# Patient Record
Sex: MALE | Race: WHITE | URBAN - METROPOLITAN AREA
[De-identification: names, ages, dates, MRNs, and addresses within clinical notes are randomized per-mention and may not be internally consistent; named-entity substitution may affect disease eponyms.]

---

## 2017-09-28 ENCOUNTER — OFFICE VISIT (OUTPATIENT)
Dept: FAMILY MEDICINE | Facility: CLINIC | Age: 62
End: 2017-09-28
Payer: COMMERCIAL

## 2017-09-28 VITALS
TEMPERATURE: 98.3 F | RESPIRATION RATE: 18 BRPM | SYSTOLIC BLOOD PRESSURE: 121 MMHG | WEIGHT: 211 LBS | BODY MASS INDEX: 33.91 KG/M2 | HEART RATE: 70 BPM | DIASTOLIC BLOOD PRESSURE: 85 MMHG | HEIGHT: 66 IN | OXYGEN SATURATION: 98 %

## 2017-09-28 DIAGNOSIS — Z13.220 SCREENING FOR LIPID DISORDERS: ICD-10-CM

## 2017-09-28 DIAGNOSIS — Z11.59 NEED FOR HEPATITIS C SCREENING TEST: ICD-10-CM

## 2017-09-28 DIAGNOSIS — Z12.11 SCREEN FOR COLON CANCER: ICD-10-CM

## 2017-09-28 DIAGNOSIS — C61 MALIGNANT NEOPLASM OF PROSTATE (H): Primary | ICD-10-CM

## 2017-09-28 DIAGNOSIS — A60.00 GENITAL HERPES SIMPLEX, UNSPECIFIED SITE: ICD-10-CM

## 2017-09-28 DIAGNOSIS — Z23 NEED FOR VACCINATION: ICD-10-CM

## 2017-09-28 DIAGNOSIS — G47.9 SLEEP DISTURBANCE: ICD-10-CM

## 2017-09-28 PROCEDURE — 84153 ASSAY OF PSA TOTAL: CPT | Performed by: INTERNAL MEDICINE

## 2017-09-28 PROCEDURE — 80061 LIPID PANEL: CPT | Performed by: INTERNAL MEDICINE

## 2017-09-28 PROCEDURE — 36415 COLL VENOUS BLD VENIPUNCTURE: CPT | Performed by: INTERNAL MEDICINE

## 2017-09-28 PROCEDURE — 86803 HEPATITIS C AB TEST: CPT | Performed by: INTERNAL MEDICINE

## 2017-09-28 PROCEDURE — 90471 IMMUNIZATION ADMIN: CPT | Performed by: INTERNAL MEDICINE

## 2017-09-28 PROCEDURE — 90714 TD VACC NO PRESV 7 YRS+ IM: CPT | Performed by: INTERNAL MEDICINE

## 2017-09-28 PROCEDURE — 99203 OFFICE O/P NEW LOW 30 MIN: CPT | Mod: 25 | Performed by: INTERNAL MEDICINE

## 2017-09-28 RX ORDER — ACYCLOVIR 200 MG/1
200 CAPSULE ORAL
Qty: 25 CAPSULE | Refills: 11 | Status: SHIPPED | OUTPATIENT
Start: 2017-09-28

## 2017-09-28 RX ORDER — ZOLPIDEM TARTRATE 5 MG/1
5 TABLET ORAL
Qty: 30 TABLET | Refills: 2 | Status: SHIPPED | OUTPATIENT
Start: 2017-09-28

## 2017-09-28 NOTE — NURSING NOTE
"    Screening Questionnaire for Adult Immunization     Are you sick today?   No    Do you have allergies to medications, food or any vaccine?   No    Have you ever had a serious reaction after receiving a vaccination?   No    Do you have a long-term health problem with heart disease, lung disease,  asthma, kidney disease, diabetes, anemia, metabolic or blood disease?   No    Do you have cancer, leukemia, AIDS, or any immune system problem?   No    Do you take cortisone, prednisone, other steroids, or anticancer drugs, or  have you had any x-ray (radiation) treatments?   No    Have you had a seizure, brain, or other nervous system problem?   No    During the past year, have you received a transfusion of blood or blood       products, or been given a medicine called immune (gamma) globulin?   No    For women: Are you pregnant or is there a chance you could become         pregnant during the next month?   No    Have you received any vaccinations in the past 4 weeks?   No     Immunization questionnaire answers were all negative.      MNVFC doesn't apply on this patient     Screening performed by Crystal Cage on 9/28/2017 at 3:08 PM.    Prior to injection verified patient identity using patient's name and date of birth.    Per orders of Dr. Apple (TD circled on rooming slip and verbal \"TD not TDAP\" also stated by )     Lluvia BECKHAM MA , injection of TD given by Crystal Cage. Patient instructed to remain in clinic for 15 minutes afterwards, and to report any adverse reaction to me immediately.        "

## 2017-09-28 NOTE — PROGRESS NOTES
SUBJECTIVE:                                                    Albino Tejada is a 62 year old male who presents to clinic today for the following health issues:      New Patient/Transfer of Care    Pleasant 62-year-old man with a history of prostate cancer, status post prostatectomy also with a history of genital herpes, sleep disturbance for which he takes rare intermittent Ambien. He has a remote history of complex lumbar spine surgeries. He required 5 lumbar spine surgeries. Luckily, since 2012, his lumbar spine pain has been under very good control. He has been able to get back into a routine exercise regimen. He recently moved here from [place]. His previous job in [place], required long hours and he was not able to exercise as much as he would've liked. However, his current job has more flexibility, and he is motivated to get into a exercise regimen to promote weight loss.    Problem list and histories reviewed & adjusted, as indicated.  Additional history: as documented    Patient Active Problem List   Diagnosis     Osteoarthrosis, unspecified whether generalized or localized, other specified sites     Sciatica     Allergic rhinitis     Diverticulitis of colon     MALIGN NEOPL PROSTATE-s/p surgery and radiation 2006     Past Surgical History:   Procedure Laterality Date     GENITOURINARY SURGERY      urethral stricture repair     HEMORRHOIDECTOMY,EXTERNAL      Hemorrhoidectomy     ORTHOPEDIC SURGERY      5 X lumbar spine surgeries      SUPRAPUBIC PROSTATECTOMY         Social History   Substance Use Topics     Smoking status: Former Smoker     Packs/day: 0.50     Years: 6.00     Quit date: 1/1/1979     Smokeless tobacco: Never Used     Alcohol use 2.0 oz/week      Comment: a couple times a week     Family History   Problem Relation Age of Onset     CANCER Mother      breast     CANCER Father      colon     Arthritis No family hx of      Alzheimer Disease No family hx of      HEART DISEASE No family hx  "of          Current Outpatient Prescriptions   Medication Sig Dispense Refill     acyclovir (ZOVIRAX) 200 MG capsule Take 1 capsule (200 mg) by mouth 5 times daily Take at first sign of outbreak 25 capsule 11     zolpidem (AMBIEN) 5 MG tablet Take 1 tablet (5 mg) by mouth nightly as needed 30 tablet 2     [DISCONTINUED] acyclovir (ZOVIRAX) 200 MG capsule Take 1 capsule by mouth 5 times daily. 25 capsule 5     No Known Allergies      ROS:  Constitutional, HEENT, cardiovascular, pulmonary, gi and gu systems are negative, except as otherwise noted.      OBJECTIVE:   /85 (BP Location: Right arm, Patient Position: Chair, Cuff Size: Adult Large)  Pulse 70  Temp 98.3  F (36.8  C) (Oral)  Resp 18  Ht 5' 6\" (1.676 m)  Wt 211 lb (95.7 kg)  SpO2 98%  BMI 34.06 kg/m2  Body mass index is 34.06 kg/(m^2).  GENERAL: healthy, alert and no distress  EYES: Eyes grossly normal to inspection, PERRL and conjunctivae and sclerae normal  HENT: ear canals and TM's normal, nose and mouth without ulcers or lesions  NECK: no adenopathy, no asymmetry, masses, or scars and thyroid normal to palpation  RESP: lungs clear to auscultation - no rales, rhonchi or wheezes  CV: regular rate and rhythm, normal S1 S2, no S3 or S4, no murmur, click or rub, no peripheral edema and peripheral pulses strong  ABDOMEN: Obese, soft, nontender, no hepatosplenomegaly, no masses and bowel sounds normal  MS: no gross musculoskeletal defects noted, no edema  SKIN: no suspicious lesions or rashes  NEURO: Normal strength and tone, mentation intact and speech normal  PSYCH: mentation appears normal, affect normal/bright    Diagnostic Test Results:  PSA tumor marker, hep C screening tests, lipid panel pending    ASSESSMENT/PLAN:     1. Malignant neoplasm of prostate (H)    - PSA, tumor marker    2. Genital herpes simplex, unspecified site  Continue as needed acyclovir for breakouts  - acyclovir (ZOVIRAX) 200 MG capsule; Take 1 capsule (200 mg) by mouth 5 " times daily Take at first sign of outbreak  Dispense: 25 capsule; Refill: 11    3. Sleep disturbance  Discussed risks of Ambien, rare intermittent use at low-dose is acceptable  - zolpidem (AMBIEN) 5 MG tablet; Take 1 tablet (5 mg) by mouth nightly as needed  Dispense: 30 tablet; Refill: 2    4. Need for hepatitis C screening test    - Hepatitis C Screen Reflex to HCV RNA Quant and Genotype    5. Screen for colon cancer    - GASTROENTEROLOGY ADULT REF PROCEDURE ONLY    6. Screening for lipid disorders    - Lipid panel reflex to direct LDL      Tetanus booster today    Counseled on diet and exercise interventions to promote weight loss        FUTURE APPOINTMENTS:       - Pending labs and symptoms    Boyd Apple MD  [facility]

## 2017-09-28 NOTE — MR AVS SNAPSHOT
After Visit Summary   9/28/2017    Albino Tejada    MRN: [id number]           Patient Information     Date Of Birth          1955        Visit Information        Provider Department      9/28/2017 1:30 PM Boyd Apple MD [facility]        Today's Diagnoses     Malignant neoplasm of prostate (H)    -  1    Genital herpes simplex, unspecified site        Sleep disturbance        Need for hepatitis C screening test        Screen for colon cancer        Screening for lipid disorders           Follow-ups after your visit        Additional Services     GASTROENTEROLOGY ADULT REF PROCEDURE ONLY       Last Lab Result: Creatinine (mg/dL)       Date                     Value                 06/06/2007               0.9              ----------  Body mass index is 34.06 kg/(m^2).     Needed:  No  Language:  English    Patient will be contacted to schedule procedure.     Please be aware that coverage of these services is subject to the terms and limitations of your health insurance plan.  Call member services at your health plan with any benefit or coverage questions.  Any procedures must be performed at a [place] facility OR coordinated by your clinic's referral office.    Please bring the following with you to your appointment:    (1) Any X-Rays, CTs or MRIs which have been performed.  Contact the facility where they were done to arrange for  prior to your scheduled appointment.    (2) List of current medications   (3) This referral request   (4) Any documents/labs given to you for this referral                  Who to contact     If you have questions or need follow up information about today's clinic visit or your schedule please contact [facility] directly at [phone].  Normal or non-critical lab and imaging results will be communicated to you by MyChart, letter or phone within 4 business days after the clinic has received the results. If you do not hear  "from us within 7 days, please contact the clinic through [employer] or phone. If you have a critical or abnormal lab result, we will notify you by phone as soon as possible.  Submit refill requests through [employer] or call your pharmacy and they will forward the refill request to us. Please allow 3 business days for your refill to be completed.          Additional Information About Your Visit        [employer] Information     [employer] gives you secure access to your electronic health record. If you see a primary care provider, you can also send messages to your care team and make appointments. If you have questions, please call your primary care clinic.  If you do not have a primary care provider, please call [phone] and they will assist you.        Care EveryWhere ID     This is your Care EveryWhere ID. This could be used by other organizations to access your [facility] medical records  [id number]        Your Vitals Were     Pulse Temperature Respirations Height Pulse Oximetry BMI (Body Mass Index)    70 98.3  F (36.8  C) (Oral) 18 5' 6\" (1.676 m) 98% 34.06 kg/m2       Blood Pressure from Last 3 Encounters:   09/28/17 121/85   03/25/11 122/74   06/10/08 106/70    Weight from Last 3 Encounters:   09/28/17 211 lb (95.7 kg)   03/25/11 197 lb (89.4 kg)   06/10/08 189 lb (85.7 kg)              We Performed the Following     GASTROENTEROLOGY ADULT REF PROCEDURE ONLY     Hepatitis C Screen Reflex to HCV RNA Quant and Genotype     Lipid panel reflex to direct LDL     PSA, tumor marker          Today's Medication Changes          These changes are accurate as of: 9/28/17  2:15 PM.  If you have any questions, ask your nurse or doctor.               These medicines have changed or have updated prescriptions.        Dose/Directions    acyclovir 200 MG capsule   Commonly known as:  ZOVIRAX   This may have changed:  additional instructions   Used for:  Genital herpes simplex, unspecified site   Changed by:  Boyd Apple MD     "    Dose:  200 mg   Take 1 capsule (200 mg) by mouth 5 times daily Take at first sign of outbreak   Quantity:  25 capsule   Refills:  11       zolpidem 5 MG tablet   Commonly known as:  AMBIEN   This may have changed:    - when to take this  - reasons to take this   Used for:  Sleep disturbance   Changed by:  Boyd Apple MD        Dose:  5 mg   Take 1 tablet (5 mg) by mouth nightly as needed   Quantity:  30 tablet   Refills:  2         Stop taking these medicines if you haven't already. Please contact your care team if you have questions.     metaxalone 800 MG tablet   Commonly known as:  SKELAXIN   Stopped by:  Boyd Apple MD           methylPREDNISolone 4 MG tablet   Commonly known as:  MEDROL DOSEPACK   Stopped by:  Boyd Apple MD           oxyCODONE-acetaminophen 5-325 MG per tablet   Commonly known as:  PERCOCET   Stopped by:  Boyd Apple MD           traMADol 50 MG tablet   Commonly known as:  ULTRAM   Stopped by:  Boyd Apple MD                Where to get your medicines      These medications were sent to [facility] [address] [place], [place] [id number]     Phone:  [phone]     acyclovir 200 MG capsule         Some of these will need a paper prescription and others can be bought over the counter.  Ask your nurse if you have questions.     Bring a paper prescription for each of these medications     zolpidem 5 MG tablet                Primary Care Provider Office Phone # Fax #    Boyd Apple [phone]       [place] [address] [address]        Equal Access to Services     [facility] AH: Hadii aad ku hadasho Soomaali, waaxda luqadaha, qaybta kaalmada adeegyada, brandi idireji arboleda. So [facility] [phone].    ATENCIÓN: Si habla español, tiene a koenig disposición servicios gratuitos de asistencia lingüística. Llame al [phone].    We comply with  applicable federal civil rights laws and [place] laws. We do not discriminate on the basis of race, color, national origin, age, disability sex, sexual orientation or gender identity.            Thank you!     Thank you for choosing [facility]  for your care. Our goal is always to provide you with excellent care. Hearing back from our patients is one way we can continue to improve our services. Please take a few minutes to complete the written survey that you may receive in the mail after your visit with us. Thank you!             Your Updated Medication List - Protect others around you: Learn how to safely use, store and throw away your medicines at www.disposemymeds.org.          This list is accurate as of: 9/28/17  2:15 PM.  Always use your most recent med list.                   Brand Name Dispense Instructions for use Diagnosis    acyclovir 200 MG capsule    ZOVIRAX    25 capsule    Take 1 capsule (200 mg) by mouth 5 times daily Take at first sign of outbreak    Genital herpes simplex, unspecified site       zolpidem 5 MG tablet    AMBIEN    30 tablet    Take 1 tablet (5 mg) by mouth nightly as needed    Sleep disturbance

## 2017-09-29 LAB
CHOLEST SERPL-MCNC: 198 MG/DL
HCV AB SERPL QL IA: NONREACTIVE
HDLC SERPL-MCNC: 57 MG/DL
LDLC SERPL CALC-MCNC: 110 MG/DL
NONHDLC SERPL-MCNC: 141 MG/DL
PSA SERPL-MCNC: <0.01 UG/L (ref 0–4)
TRIGL SERPL-MCNC: 153 MG/DL

## 2017-10-01 NOTE — PROGRESS NOTES
"The following letter pertains to your most recent diagnostic tests:    -Your hepatitis C screening test is negative.  You do not have hepatitis C.     -Your prostate specific antigen (PSA) test result returned normal.     -Your total cholesterol is 198 which is at your goal of total cholesterol less than 200.    -Your triglycerides are 153 which are just above your goal of triglycerides less than 150.    -Your HDL or \"good cholesterol\" is 57 which is at your goal of HDL cholesterol greater than 40.    -Your LDL cholesterol or \"bad cholesterol\" is 119 which is at your goal of LDL cholesterol less than <130.  Your LDL goal is based on your risk factors for artery disease.       Bottom line:  Watch the sugars, starches and fats in your diet to improve your triglycerides.  Otherwise your lab results look healthy.        Follow up:  Schedule an appointment for a physical examination with fasting blood tests in one year's time, or return sooner if new questions, symptoms or problems arise.       Sincerely,    Dr. Apple"

## 2017-10-26 ENCOUNTER — SURGERY (OUTPATIENT)
Age: 62
End: 2017-10-26

## 2017-10-26 ENCOUNTER — HOSPITAL ENCOUNTER (OUTPATIENT)
Facility: CLINIC | Age: 62
Discharge: NURSING, REHAB OR LONG-TERM CARE | End: 2017-10-26
Attending: INTERNAL MEDICINE | Admitting: INTERNAL MEDICINE
Payer: COMMERCIAL

## 2017-10-26 VITALS
RESPIRATION RATE: 11 BRPM | WEIGHT: 195 LBS | OXYGEN SATURATION: 94 % | SYSTOLIC BLOOD PRESSURE: 100 MMHG | BODY MASS INDEX: 31.34 KG/M2 | DIASTOLIC BLOOD PRESSURE: 75 MMHG | HEIGHT: 66 IN

## 2017-10-26 LAB — COLONOSCOPY: NORMAL

## 2017-10-26 PROCEDURE — 25000128 H RX IP 250 OP 636: Performed by: INTERNAL MEDICINE

## 2017-10-26 PROCEDURE — 99153 MOD SED SAME PHYS/QHP EA: CPT | Performed by: INTERNAL MEDICINE

## 2017-10-26 PROCEDURE — 45385 COLONOSCOPY W/LESION REMOVAL: CPT | Mod: PT | Performed by: INTERNAL MEDICINE

## 2017-10-26 PROCEDURE — 45380 COLONOSCOPY AND BIOPSY: CPT | Performed by: INTERNAL MEDICINE

## 2017-10-26 PROCEDURE — 88305 TISSUE EXAM BY PATHOLOGIST: CPT | Performed by: INTERNAL MEDICINE

## 2017-10-26 PROCEDURE — 88305 TISSUE EXAM BY PATHOLOGIST: CPT | Mod: 26 | Performed by: INTERNAL MEDICINE

## 2017-10-26 RX ORDER — ONDANSETRON 2 MG/ML
4 INJECTION INTRAMUSCULAR; INTRAVENOUS
Status: DISCONTINUED | OUTPATIENT
Start: 2017-10-26 | End: 2017-10-26 | Stop reason: HOSPADM

## 2017-10-26 RX ORDER — LIDOCAINE 40 MG/G
CREAM TOPICAL
Status: DISCONTINUED | OUTPATIENT
Start: 2017-10-26 | End: 2017-10-26 | Stop reason: HOSPADM

## 2017-10-26 RX ORDER — FENTANYL CITRATE 50 UG/ML
INJECTION, SOLUTION INTRAMUSCULAR; INTRAVENOUS PRN
Status: DISCONTINUED | OUTPATIENT
Start: 2017-10-26 | End: 2017-10-26 | Stop reason: HOSPADM

## 2017-10-26 RX ADMIN — MIDAZOLAM HYDROCHLORIDE 2 MG: 1 INJECTION, SOLUTION INTRAMUSCULAR; INTRAVENOUS at 09:03

## 2017-10-26 RX ADMIN — FENTANYL CITRATE 100 MCG: 50 INJECTION, SOLUTION INTRAMUSCULAR; INTRAVENOUS at 09:03

## 2017-10-26 RX ADMIN — MIDAZOLAM HYDROCHLORIDE 2 MG: 1 INJECTION, SOLUTION INTRAMUSCULAR; INTRAVENOUS at 09:05

## 2017-10-27 LAB — COPATH REPORT: NORMAL

## 2018-01-15 ENCOUNTER — OFFICE VISIT (OUTPATIENT)
Dept: FAMILY MEDICINE | Facility: CLINIC | Age: 63
End: 2018-01-15
Payer: COMMERCIAL

## 2018-01-15 ENCOUNTER — RADIANT APPOINTMENT (OUTPATIENT)
Dept: GENERAL RADIOLOGY | Facility: CLINIC | Age: 63
End: 2018-01-15
Attending: INTERNAL MEDICINE
Payer: COMMERCIAL

## 2018-01-15 VITALS
SYSTOLIC BLOOD PRESSURE: 117 MMHG | BODY MASS INDEX: 34.23 KG/M2 | HEART RATE: 74 BPM | DIASTOLIC BLOOD PRESSURE: 83 MMHG | TEMPERATURE: 97.1 F | OXYGEN SATURATION: 95 % | HEIGHT: 66 IN | WEIGHT: 213 LBS

## 2018-01-15 DIAGNOSIS — M79.671 RIGHT FOOT PAIN: ICD-10-CM

## 2018-01-15 DIAGNOSIS — M79.671 RIGHT FOOT PAIN: Primary | ICD-10-CM

## 2018-01-15 LAB
ANION GAP SERPL CALCULATED.3IONS-SCNC: 8 MMOL/L (ref 3–14)
BUN SERPL-MCNC: 14 MG/DL (ref 7–30)
CALCIUM SERPL-MCNC: 9 MG/DL (ref 8.5–10.1)
CHLORIDE SERPL-SCNC: 109 MMOL/L (ref 94–109)
CO2 SERPL-SCNC: 24 MMOL/L (ref 20–32)
CREAT SERPL-MCNC: 0.88 MG/DL (ref 0.66–1.25)
CRP SERPL-MCNC: 18 MG/L (ref 0–8)
ERYTHROCYTE [DISTWIDTH] IN BLOOD BY AUTOMATED COUNT: 13.9 % (ref 10–15)
ERYTHROCYTE [SEDIMENTATION RATE] IN BLOOD BY WESTERGREN METHOD: 7 MM/H (ref 0–20)
GFR SERPL CREATININE-BSD FRML MDRD: 88 ML/MIN/1.7M2
GLUCOSE SERPL-MCNC: 103 MG/DL (ref 70–99)
HCT VFR BLD AUTO: 42.1 % (ref 40–53)
HGB BLD-MCNC: 14.5 G/DL (ref 13.3–17.7)
MCH RBC QN AUTO: 32.2 PG (ref 26.5–33)
MCHC RBC AUTO-ENTMCNC: 34.4 G/DL (ref 31.5–36.5)
MCV RBC AUTO: 94 FL (ref 78–100)
PLATELET # BLD AUTO: 176 10E9/L (ref 150–450)
POTASSIUM SERPL-SCNC: 4.3 MMOL/L (ref 3.4–5.3)
RBC # BLD AUTO: 4.5 10E12/L (ref 4.4–5.9)
SODIUM SERPL-SCNC: 141 MMOL/L (ref 133–144)
URATE SERPL-MCNC: 7.8 MG/DL (ref 3.5–7.2)
WBC # BLD AUTO: 5.1 10E9/L (ref 4–11)

## 2018-01-15 PROCEDURE — 86140 C-REACTIVE PROTEIN: CPT | Performed by: INTERNAL MEDICINE

## 2018-01-15 PROCEDURE — 36415 COLL VENOUS BLD VENIPUNCTURE: CPT | Performed by: INTERNAL MEDICINE

## 2018-01-15 PROCEDURE — 73630 X-RAY EXAM OF FOOT: CPT | Mod: RT

## 2018-01-15 PROCEDURE — 84550 ASSAY OF BLOOD/URIC ACID: CPT | Performed by: INTERNAL MEDICINE

## 2018-01-15 PROCEDURE — 80048 BASIC METABOLIC PNL TOTAL CA: CPT | Performed by: INTERNAL MEDICINE

## 2018-01-15 PROCEDURE — 85652 RBC SED RATE AUTOMATED: CPT | Performed by: INTERNAL MEDICINE

## 2018-01-15 PROCEDURE — 99214 OFFICE O/P EST MOD 30 MIN: CPT | Performed by: INTERNAL MEDICINE

## 2018-01-15 PROCEDURE — 85027 COMPLETE CBC AUTOMATED: CPT | Performed by: INTERNAL MEDICINE

## 2018-01-15 PROCEDURE — 87040 BLOOD CULTURE FOR BACTERIA: CPT | Performed by: INTERNAL MEDICINE

## 2018-01-15 RX ORDER — PREDNISONE 20 MG/1
20 TABLET ORAL DAILY
Qty: 5 TABLET | Refills: 0 | Status: SHIPPED | OUTPATIENT
Start: 2018-01-15 | End: 2018-01-16

## 2018-01-15 NOTE — PROGRESS NOTES
SUBJECTIVE:   Albino Tejada is a 62 year old male who presents to clinic today for the following health issues:    Right Foot Pain      Duration: One week    Description (location/character/radiation): Right foot    Intensity:  severe    Accompanying signs and symptoms: Redness    History (similar episodes/previous evaluation): None    Precipitating or alleviating factors: None    Therapies tried and outcome: Ibuprofen         Pleasant man who presents with right foot pain.  He describes a nontender bump on the lateral aspect of his right foot that has been present for several months but not really noticeable until 1 week ago when it became red, moderately tender.  Over the last several days, some of the tenderness has been spreading proximally across the dorsum of his foot and the anterior shin.  There has been no associated injury or trauma.  There has been no associated fevers, chills, nausea, vomiting, cough, sore throat, chest pain, dyspnea, dysuria.  No known history of gout.    Problem list and histories reviewed & adjusted, as indicated.  Additional history: as documented    Patient Active Problem List   Diagnosis     Osteoarthrosis, unspecified whether generalized or localized, other specified sites     Sciatica     Allergic rhinitis     Diverticulitis of colon     MALIGN NEOPL PROSTATE-s/p surgery and radiation 2006     Past Surgical History:   Procedure Laterality Date     COLONOSCOPY N/A 10/26/2017    Procedure: COMBINED COLONOSCOPY, SINGLE OR MULTIPLE BIOPSY/POLYPECTOMY BY BIOPSY;;  Surgeon: Damon Brooks MD;  Location:  GI     GENITOURINARY SURGERY      urethral stricture repair     HEMORRHOIDECTOMY,EXTERNAL      Hemorrhoidectomy     ORTHOPEDIC SURGERY      5 X lumbar spine surgeries      SUPRAPUBIC PROSTATECTOMY         Social History   Substance Use Topics     Smoking status: Former Smoker     Packs/day: 0.50     Years: 6.00     Quit date: 1/1/1979     Smokeless tobacco: Never Used      "Alcohol use 2.0 oz/week      Comment: a couple times a week     Family History   Problem Relation Age of Onset     CANCER Mother      breast     CANCER Father      colon     Coronary Artery Disease Father      Arthritis No family hx of      Alzheimer Disease No family hx of      HEART DISEASE No family hx of          Current Outpatient Prescriptions   Medication Sig Dispense Refill     predniSONE (DELTASONE) 20 MG tablet Take 1 tablet (20 mg) by mouth daily 5 tablet 0     acyclovir (ZOVIRAX) 200 MG capsule Take 1 capsule (200 mg) by mouth 5 times daily Take at first sign of outbreak (Patient taking differently: Take 200 mg by mouth daily as needed Take at first sign of outbreak) 25 capsule 11     zolpidem (AMBIEN) 5 MG tablet Take 1 tablet (5 mg) by mouth nightly as needed 30 tablet 2     Allergies   Allergen Reactions     No Known Allergies          Reviewed and updated as needed this visit by clinical staff     Reviewed and updated as needed this visit by Provider         ROS:  Constitutional, HEENT, cardiovascular, pulmonary, gi and gu systems are negative, except as otherwise noted.      OBJECTIVE:   /83 (BP Location: Right arm, Cuff Size: Adult Large)  Pulse 74  Temp 97.1  F (36.2  C) (Oral)  Ht 5' 6\" (1.676 m)  Wt 213 lb (96.6 kg)  SpO2 95%  BMI 34.38 kg/m2  Body mass index is 34.38 kg/(m^2).  General: This is a nontoxic appearing, well-appearing middle-aged man in no acute distress she speaks in full sentences.  Foot: There is a moderately tender bony prominence along the lateral aspect of the fifth metatarsal bone of the right distal lower extremity, there is a mild amount of erythema associated with the prominence and extending into the skin adjacent along the lateral foot and dorsal foot.  There is no skin erythema extending proximal to the ankle.  The ankle joint has full pain-free range of motion.  There is no bony tenderness over the bony structures of the ankle.  There are no foot " ulcerations or areas of skin breakdown.  There are no areas of fluctuance that I can appreciate.  Homans sign is negative.  Dorsalis pedis pulse is strong.  The foot is not warm to the touch.    Diagnostic Test Results:  Labs including CRP, ESR, uric acid level, BMP, CBC, blood culture are pending  Foot x-ray is pending    ASSESSMENT/PLAN:             1. Right foot pain  Differential diagnosis for this patient's tender right foot with bony prominence include gout with tender tophus, bone neoplasm, less likely infection or inflammatory process.  Check below diagnostic test to further evaluate.  Therapeutic trial of prednisone to see if this is a gout flareup.  If no clinical response to prednisone by Wednesday afternoon, patient is to contact me to discuss further treatments.  He is to contact me sooner if symptoms worsen despite prednisone, at which point, antibiotics should be considered.  There are no clinical signs of systemic infection such as fevers or symptoms of systemic infection.  Regarding the bony prominence of the foot, a podiatry consult will be helpful to further characterize and treat either surgically or with footwear suggestions.  The patient will schedule appointment with podiatry.  - Basic metabolic panel  - CBC with platelets  - Uric acid  - CRP, inflammation  - ESR: Erythrocyte sedimentation rate  - Blood culture  - XR Foot Right G/E 3 Views; Future  - PODIATRY/FOOT & ANKLE SURGERY REFERRAL  - predniSONE (DELTASONE) 20 MG tablet; Take 1 tablet (20 mg) by mouth daily  Dispense: 5 tablet; Refill: 0    FUTURE APPOINTMENTS:       -He will schedule follow-up appointment with podiatry as soon as an appointment is available, future appointments with me will depend on the diagnostic evaluation as above and symptom response to therapy for suspected gout.    Boyd Apple MD  [facility]

## 2018-01-15 NOTE — MR AVS SNAPSHOT
After Visit Summary   1/15/2018    Albino Tejada    MRN: [id number]           Patient Information     Date Of Birth          1955        Visit Information        Provider Department      1/15/2018 10:00 AM Boyd Apple MD [facility] [place]        Today's Diagnoses     Right foot pain    -  1       Follow-ups after your visit        Additional Services     PODIATRY/FOOT & ANKLE SURGERY REFERRAL       Your provider has referred you to: [facility]: [facility] [place] [phone]   [email or web address]    Please be aware that coverage of these services is subject to the terms and limitations of your health insurance plan.  Call member services at your health plan with any benefit or coverage questions.      Please bring the following to your appointment:  >>   Any x-rays, CTs or MRIs which have been performed.  Contact the facility where they were done to arrange for  prior to your scheduled appointment.    >>   List of current medications   >>   This referral request   >>   Any documents/labs given to you for this referral                  Future tests that were ordered for you today     Open Future Orders        Priority Expected Expires Ordered    XR Foot Right G/E 3 Views Routine 1/15/2018 1/15/2019 1/15/2018            Who to contact     If you have questions or need follow up information about today's clinic visit or your schedule please contact [facility] directly at [phone].  Normal or non-critical lab and imaging results will be communicated to you by MyChart, letter or phone within 4 business days after the clinic has received the results. If you do not hear from us within 7 days, please contact the clinic through MyChart or phone. If you have a critical or abnormal lab result, we will notify you by phone as soon as possible.  Submit refill requests through [employer] or call your pharmacy and they will forward the refill request to us.  "Please allow 3 business days for your refill to be completed.          Additional Information About Your Visit        StudioTweetshart Information     [employer] gives you secure access to your electronic health record. If you see a primary care provider, you can also send messages to your care team and make appointments. If you have questions, please call your primary care clinic.  If you do not have a primary care provider, please call [phone] and they will assist you.        Care EveryWhere ID     This is your Care EveryWhere ID. This could be used by other organizations to access your [place] medical records  [id number]        Your Vitals Were     Pulse Temperature Height Pulse Oximetry BMI (Body Mass Index)       74 97.1  F (36.2  C) (Oral) 5' 6\" (1.676 m) 95% 34.38 kg/m2        Blood Pressure from Last 3 Encounters:   01/15/18 117/83   10/26/17 100/75   09/28/17 121/85    Weight from Last 3 Encounters:   01/15/18 213 lb (96.6 kg)   10/26/17 195 lb (88.5 kg)   09/28/17 211 lb (95.7 kg)              We Performed the Following     Basic metabolic panel     Blood culture     CBC with platelets     CRP, inflammation     ESR: Erythrocyte sedimentation rate     PODIATRY/FOOT & ANKLE SURGERY REFERRAL     Uric acid          Today's Medication Changes          These changes are accurate as of: 1/15/18 10:46 AM.  If you have any questions, ask your nurse or doctor.               Start taking these medicines.        Dose/Directions    predniSONE 20 MG tablet   Commonly known as:  DELTASONE   Used for:  Right foot pain   Started by:  Boyd Apple MD        Dose:  20 mg   Take 1 tablet (20 mg) by mouth daily   Quantity:  5 tablet   Refills:  0         These medicines have changed or have updated prescriptions.        Dose/Directions    acyclovir 200 MG capsule   Commonly known as:  ZOVIRAX   This may have changed:    - when to take this  - reasons to take this  - additional instructions   Used for:  Genital herpes simplex, " unspecified site        Dose:  200 mg   Take 1 capsule (200 mg) by mouth 5 times daily Take at first sign of outbreak   Quantity:  25 capsule   Refills:  11            Where to get your medicines      These medications were sent to [facility], [place] - [address]  [address], [place] [address]     Phone:  [phone]     predniSONE 20 MG tablet                Primary Care Provider Office Phone # Fax #    Boyd Apple [phone]       [facility] [address]  [place] [address]        Equal Access to Services     [facility]: Hadii aad ku hadasho Soomaali, waaxda luqadaha, qaybta kaalmada adeegyada, waxay xochiltin hayberlin choudhury . So [facility] [phone].    ATENCIÓN: Si habla español, tiene a koenig disposición servicios gratuitos de asistencia lingüística. [facility] [phone].    We comply with applicable federal civil rights laws and [place] laws. We do not discriminate on the basis of race, color, national origin, age, disability, sex, sexual orientation, or gender identity.            Thank you!     Thank you for choosing [facility]  for your care. Our goal is always to provide you with excellent care. Hearing back from our patients is one way we can continue to improve our services. Please take a few minutes to complete the written survey that you may receive in the mail after your visit with us. Thank you!             Your Updated Medication List - Protect others around you: Learn how to safely use, store and throw away your medicines at www.disposemymeds.org.          This list is accurate as of: 1/15/18 10:46 AM.  Always use your most recent med list.                   Brand Name Dispense Instructions for use Diagnosis    acyclovir 200 MG capsule    ZOVIRAX    25 capsule    Take 1 capsule (200 mg) by mouth 5 times daily Take at first sign of outbreak    Genital herpes simplex,  unspecified site       predniSONE 20 MG tablet    DELTASONE    5 tablet    Take 1 tablet (20 mg) by mouth daily    Right foot pain       zolpidem 5 MG tablet    AMBIEN    30 tablet    Take 1 tablet (5 mg) by mouth nightly as needed    Sleep disturbance

## 2018-01-15 NOTE — NURSING NOTE
"Chief Complaint   Patient presents with     Foot Pain     Right foot       Initial /83 (BP Location: Right arm, Cuff Size: Adult Large)  Pulse 74  Temp 97.1  F (36.2  C) (Oral)  Ht 5' 6\" (1.676 m)  Wt 213 lb (96.6 kg)  SpO2 95%  BMI 34.38 kg/m2 Estimated body mass index is 34.38 kg/(m^2) as calculated from the following:    Height as of this encounter: 5' 6\" (1.676 m).    Weight as of this encounter: 213 lb (96.6 kg).  Medication Reconciliation: complete       Lorraine Kirkland CMA      "

## 2018-01-16 RX ORDER — CEPHALEXIN 500 MG/1
500 CAPSULE ORAL 3 TIMES DAILY
Qty: 30 CAPSULE | Refills: 0 | Status: SHIPPED | OUTPATIENT
Start: 2018-01-16

## 2018-01-16 RX ORDER — CEPHALEXIN 500 MG/1
500 CAPSULE ORAL 3 TIMES DAILY
Qty: 30 CAPSULE | Refills: 0 | Status: SHIPPED | OUTPATIENT
Start: 2018-01-16 | End: 2018-01-16

## 2018-01-16 NOTE — PROGRESS NOTES
Discussed lab results and x-ray findings with patient by phone.  He is no better 24 hours after his initial prednisone dose.  This makes me suspicious that his symptoms are not from gout.  X-ray findings and labs are more suggestive of a skin abscess or potentially an infected or inflamed tendon cyst?  Definitive therapy will be incision and drainage or excision of cyst.  This can be performed by podiatry.  I recommended a podiatry consultation this week or early next week at the latest to the patient.  He will call for an appointment.  He will contact me if he does not get a prompt appointment.  In the meantime, stop taking prednisone, start antibiotic to prevent cellulitis associated with infected skin abscess or infected/inflamed ganglion cyst.  Antibiotic prescription sent to patient's pharmacy.

## 2018-01-21 LAB
BACTERIA SPEC CULT: NO GROWTH
Lab: NORMAL
SPECIMEN SOURCE: NORMAL

## 2018-01-23 ENCOUNTER — OFFICE VISIT (OUTPATIENT)
Dept: PODIATRY | Facility: CLINIC | Age: 63
End: 2018-01-23
Payer: COMMERCIAL

## 2018-01-23 VITALS
DIASTOLIC BLOOD PRESSURE: 82 MMHG | HEIGHT: 66 IN | SYSTOLIC BLOOD PRESSURE: 126 MMHG | WEIGHT: 213 LBS | BODY MASS INDEX: 34.23 KG/M2

## 2018-01-23 DIAGNOSIS — M67.40 GANGLION CYST: Primary | ICD-10-CM

## 2018-01-23 PROCEDURE — 99203 OFFICE O/P NEW LOW 30 MIN: CPT | Performed by: PODIATRIST

## 2018-01-23 NOTE — LETTER
"    1/23/2018         RE: Albino Tejaad  [address] [facility] [address]        Dear Colleague,    Thank you for referring your patient, Albino Tejada, to the [facility] PODIATRY. Please see a copy of my visit note below.    Foot & Ankle Surgery  January 23, 2018    CC: R foot cyst/boil    I was asked to see Albino Tejada regarding the chief complaint by:  Dr. TREVOR Apple    HPI:  Pt is a 62 year old male who presents with above complaint.  R foot cyst x 2 weeks, no injury noted. It gets a little bigger.  Shoes \"too long\" and pounding on concrete at work, it gets red/inflamed.  Better without shoes.  xrays 1/15/18 neg.  Labs show slight elevation of his UA and CRP.  He was put on a PO abx for some redness/pain and symptoms have improved.  Pain 6/10 continuously, worse with shoes.  Prednisone has helped.      ROS:   Pos for CC.  The patient denies current nausea, vomiting, chills, fevers, belly pain, calf pain, chest pain or SOB.  Complete remainder of ROS is otherwise neg.    VITALS:    Vitals:    01/23/18 1421   BP: 126/82   BP Location: Right arm   Patient Position: Sitting   Cuff Size: Adult Large   Weight: 213 lb (96.6 kg)   Height: 5' 6\" (1.676 m)       PMH:    Past Medical History:   Diagnosis Date     MALIGN NEOPL PROSTATE-s/p surgery and radiation 2006 10/10/2006       SXHX:    Past Surgical History:   Procedure Laterality Date     COLONOSCOPY N/A 10/26/2017    Procedure: COMBINED COLONOSCOPY, SINGLE OR MULTIPLE BIOPSY/POLYPECTOMY BY BIOPSY;;  Surgeon: Damon Brooks MD;  Location:  GI     GENITOURINARY SURGERY      urethral stricture repair     HEMORRHOIDECTOMY,EXTERNAL      Hemorrhoidectomy     ORTHOPEDIC SURGERY      5 X lumbar spine surgeries      SUPRAPUBIC PROSTATECTOMY          MEDS:    Current Outpatient Prescriptions   Medication     cephALEXin (KEFLEX) 500 MG capsule     acyclovir (ZOVIRAX) 200 MG capsule     zolpidem (AMBIEN) 5 MG tablet     No current " facility-administered medications for this visit.        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[place] [address]      Social History Main Topics     Smoking status: Former Smoker     Packs/day: 0.50     Years: 6.00     Quit date: 1/1/1979     Smokeless tobacco: Never Used     Alcohol use 2.0 oz/week      Comment: a couple times a week     Drug use: No     Sexual activity: Yes     Partners: Female     Birth control/ protection: Condom     Other Topics Concern     Exercise Yes     Seat Belt Yes     Self-Exams Yes     Social History Narrative           EXAMINATION:  Gen:   No apparent distress  Neuro:   A&Ox3, no deficits  Psych:    Answering questions appropriately for age and situation with normal affect  Head:    NCAT  Eye:    Visual scanning without deficit  Ear:    Response to auditory stimuli wnl  Lung:    Non-labored breathing on RA noted  Abd:    NTND per patient report  Lymph:    Neg for pitting/non-pitting edema BLE  Vasc:    Pulses palpable, CFT minimally delayed  Neuro:    Light touch sensation intact to all sensory nerve distributions without paresthesias  Derm:    Neg for nodules, lesions or ulcerations  MSK:    R foot shows firm nodule/cyst near 5th met consistent with ganglion cyst.  Tender to palpation.  Minimal erythema/edema.    Calf:    Neg for redness, swelling or tenderness      Imaging:  xrays 1/15/18 - IMPRESSION: No osseous abnormality to correspond with the fifth metatarsal mass.    Labs:    Component       Latest Ref Rng & Units 1/15/2018   Sodium      133 - 144 mmol/L 141   Potassium      3.4 - 5.3 mmol/L 4.3   Chloride      94 - 109 mmol/L 109   Carbon Dioxide      20 - 32 mmol/L 24   Anion Gap      3 - 14 mmol/L 8   Glucose      70 - 99 mg/dL 103 (H)   Urea Nitrogen      7 - 30 mg/dL 14   Creatinine      0.66 - 1.25 mg/dL 0.88   GFR Estimate      >60 mL/min/1.7m2 88   GFR Estimate If Black      >60 mL/min/1.7m2 >90   Calcium      8.5 - 10.1 mg/dL 9.0   WBC      4.0 - 11.0 10e9/L 5.1   RBC Count      4.4 - 5.9 10e12/L 4.50   Hemoglobin      13.3 - 17.7 g/dL 14.5   Hematocrit      40.0 - 53.0 % 42.1   MCV      78 - 100 fl 94   MCH      26.5 - 33.0 pg 32.2   MCHC      31.5 - 36.5 g/dL 34.4   RDW      10.0 - 15.0 % 13.9   Platelet Count      150 - 450 10e9/L 176   Specimen Description       Blood Right Arm   Special Requests       Aerobic and anaerobic bottles received   Culture Micro       No growth   Uric Acid      3.5 - 7.2 mg/dL 7.8 (H)   CRP Inflammation      0.0 - 8.0 mg/L 18.0 (H)   Sed Rate      0 - 20 mm/h 7     Assessment:  62 year old male with ganglion cyst R foot      Plan:  Discussed etiologies, anatomy and options  1.  Ganglion cyst right foot  -personally reviewed imaging and labs  -finish PO abx course  -RICE/NSAID prn  -discussed padding and shoe gear changes  -discussed aspiration vs surgical options  -follow up 1 week, aspiration if no improvement    Follow up:  1 week or sooner with acute issues      Patient's medical history was reviewed today    Body mass index is 34.38 kg/(m^2).  Weight management plan: Patient was referred to their PCP to discuss a diet and exercise plan.        Alexander Taylor DPM   Podiatric Foot & Ankle Surgeon  [facility]  [phone]        Again, thank you for allowing me to participate in the care of your patient.        Sincerely,        Alexander Taylor DPM, SENTHIL

## 2018-01-23 NOTE — MR AVS SNAPSHOT
After Visit Summary   1/23/2018    Albino Tejada    MRN: [id number]           Patient Information     Date Of Birth          1955        Visit Information        Provider Department      1/23/2018 2:30 PM Alexander Deleon DPM [facility] PODIATRY        Care Instructions    Thank you for choosing [facility] / [facility]!    DR. DELEON'S CLINIC LOCATIONS:   MONDAY - [place] TUESDAY - [place]   [address] [facility]  [address]   [place] [address] [place] [address]   [phone]       THURSDAY AM - [place] THURSDAY PM - [place]   [address]   [place] [address] [place], [place] [address] [phone]       FRIDAY AM - [place] SET UP SURGERY: [phone] [address] APPOINTMENTS: [phone]   [place] [address] BILLING QUESTIONS: [phone] FAX NUMBER: [phone]     Follow Up: in 1 week    PRICE Therapy    Many aches and pains throughout the foot and ankle can be helped with many simple treatments.  This is usually described as PRICE Therapy.      P - Protection - often times, inflammation/pain in the lower extremity is not able to improve simply because the areas involved are never allowed to rest.  Every step we take can bother the problematic area.  Protecting those areas is an important step in the healing process.  This may involve a walking cast boot, a special insert/orthotic device, an ankle brace, or simply avoiding barefoot walking.    R - Rest - in addition to protecting the foot/ankle, resting is an important, but often times difficult, treatment option.  Getting off your feet when they bother you, and specifically avoiding activities that cause pain/discomfort, are very beneficial to prevent, and treat, foot/ankle pain.      I - Ice - icing regularly can help to decrease inflammation and swelling in the foot, thus decreasing pain.  Using an ice  pack or a bag of frozen peas works very well.  Ice for 20 minutes multiple times per day as needed.  Do not place the ice directly on the skin as this can cause tissue damage.    C - Compression - using a compression wrap or an ACE wrap can help to decrease swelling, which can help to decrease pain.  Wearing the wraps is generally not needed at night, but they should be worn on a regular basis when you are going to be on your feet for prolonged periods as gravity tends to pull fluids down to your feet/ankles.    E - Elevation - elevating your lower extremities multiple times daily for 15-20 minutes can help to decrease swelling, which works well in decreasing pain levels.      NSAID/Tylenol - An anti-inflammatory, like Aleve or ibuprofen, and/or a pain medication, such as Tylenol, can help to improve pain levels and get the issue resolved sooner rather than later.  Anyone with liver issues should be careful with Tylenol, and anyone with high blood pressure or heart, stomach or kidney issues should be careful with anti-inflammatories.  Please ask if you have questions about these medications, including dosage.        Body Mass Index (BMI)  Many things can cause foot and ankle problems. Foot structure, activity level, foot mechanics and injuries are common causes of pain. One very important issue that often goes unmentioned, is body weight. Extra weight can cause increased stress on muscles, ligaments, bones and tendons. Sometimes just a few extra pounds is all it takes to put one over her/his threshold. Without reducing that stress, it can be difficult to alleviate pain. Some people are uncomfortable addressing this issue, but we feel it is important for you to think about it. As Foot &  Ankle specialists, our job is addressing the lower extremity problem and possible causes. Regarding extra body weight, we encourage patients to discuss diet and weight management plans with their primary care doctors. It is this team  "approach that gives you the best opportunity for pain relief and getting you back on your feet.            Follow-ups after your visit        Who to contact     If you have questions or need follow up information about today's clinic visit or your schedule please contact [facility] PODIATRY directly at [phone].  Normal or non-critical lab and imaging results will be communicated to you by MyChart, letter or phone within 4 business days after the clinic has received the results. If you do not hear from us within 7 days, please contact the clinic through [employer] or phone. If you have a critical or abnormal lab result, we will notify you by phone as soon as possible.  Submit refill requests through HearMeOut or call your pharmacy and they will forward the refill request to us. Please allow 3 business days for your refill to be completed.          Additional Information About Your Visit        MyChart Information     HearMeOut gives you secure access to your electronic health record. If you see a primary care provider, you can also send messages to your care team and make appointments. If you have questions, please call your primary care clinic.  If you do not have a primary care provider, please call [phone] and they will assist you.        Care EveryWhere ID     This is your Care EveryWhere ID. This could be used by other organizations to access your [place] medical records  [id number]        Your Vitals Were     Height BMI (Body Mass Index)                5' 6\" (1.676 m) 34.38 kg/m2           Blood Pressure from Last 3 Encounters:   01/23/18 126/82   01/15/18 117/83   10/26/17 100/75    Weight from Last 3 Encounters:   01/23/18 213 lb (96.6 kg)   01/15/18 213 lb (96.6 kg)   10/26/17 195 lb (88.5 kg)              Today, you had the following     No orders found for display       Primary Care Provider Office Phone # Fax #    Boyd Apple [phone]       [facility] [address] [facility] [address]  [facility] [address]        Equal Access to Services     [facility] : Hadii aad ku hadдмитрийosmin Soliz, wagregda luqadaha, qagrista kakalada aldenlorijhoan, brandi ceballos joshuanasima bethealeijayne arboleda. So [facility] [phone].    ATENCIÓN: Si habla español, tiene a koenig disposición servicios gratuitos de asistencia lingüística. Llame al [phone].    We comply with applicable federal civil rights laws and [place] laws. We do not discriminate on the basis of race, color, national origin, age, disability, sex, sexual orientation, or gender identity.            Thank you!     Thank you for choosing [facility] PODIATRY  for your care. Our goal is always to provide you with excellent care. Hearing back from our patients is one way we can continue to improve our services. Please take a few minutes to complete the written survey that you may receive in the mail after your visit with us. Thank you!             Your Updated Medication List - Protect others around you: Learn how to safely use, store and throw away your medicines at www.disposemymeds.org.          This list is accurate as of: 1/23/18  2:41 PM.  Always use your most recent med list.                   Brand Name Dispense Instructions for use Diagnosis    acyclovir 200 MG capsule    ZOVIRAX    25 capsule    Take 1 capsule (200 mg) by mouth 5 times daily Take at first sign of outbreak    Genital herpes simplex, unspecified site       cephALEXin 500 MG capsule    KEFLEX    30 capsule    Take 1 capsule (500 mg) by mouth 3 times daily    Right foot pain       zolpidem 5 MG tablet    AMBIEN    30 tablet    Take 1 tablet (5 mg) by mouth nightly as needed    Sleep disturbance

## 2018-01-23 NOTE — NURSING NOTE
"Chief Complaint   Patient presents with     Foot Problems     Right foot pain, bump along lateral aspect of foot       Initial /82 (BP Location: Right arm, Patient Position: Sitting, Cuff Size: Adult Large)  Ht 5' 6\" (1.676 m)  Wt 213 lb (96.6 kg)  BMI 34.38 kg/m2 Estimated body mass index is 34.38 kg/(m^2) as calculated from the following:    Height as of this encounter: 5' 6\" (1.676 m).    Weight as of this encounter: 213 lb (96.6 kg).  Medication Reconciliation: complete    "

## 2018-01-23 NOTE — PATIENT INSTRUCTIONS
Thank you for choosing [facility]!    DR. DELEON'S CLINIC LOCATIONS:   MONDAY - [place] TUESDAY - [place]   [address] [facility]  [address]   [place] [address] [place], [place] [id number] [phone]       THURSDAY AM - [place] THURSDAY PM - [place]   [address]   [place] [address] [place], [place] [address] [phone]       FRIDAY AM - [place] SET UP SURGERY: [phone] [address] APPOINTMENTS: [phone]   [place] [address] BILLING QUESTIONS: [phone] FAX NUMBER: [phone]     Follow Up: in 1 week    PRICE Therapy    Many aches and pains throughout the foot and ankle can be helped with many simple treatments.  This is usually described as PRICE Therapy.      P - Protection - often times, inflammation/pain in the lower extremity is not able to improve simply because the areas involved are never allowed to rest.  Every step we take can bother the problematic area.  Protecting those areas is an important step in the healing process.  This may involve a walking cast boot, a special insert/orthotic device, an ankle brace, or simply avoiding barefoot walking.    R - Rest - in addition to protecting the foot/ankle, resting is an important, but often times difficult, treatment option.  Getting off your feet when they bother you, and specifically avoiding activities that cause pain/discomfort, are very beneficial to prevent, and treat, foot/ankle pain.      I - Ice - icing regularly can help to decrease inflammation and swelling in the foot, thus decreasing pain.  Using an ice pack or a bag of frozen peas works very well.  Ice for 20 minutes multiple times per day as needed.  Do not place the ice directly on the skin as this can cause tissue damage.    C - Compression - using a compression wrap or an ACE wrap can help to decrease swelling, which can help to decrease pain.   Wearing the wraps is generally not needed at night, but they should be worn on a regular basis when you are going to be on your feet for prolonged periods as gravity tends to pull fluids down to your feet/ankles.    E - Elevation - elevating your lower extremities multiple times daily for 15-20 minutes can help to decrease swelling, which works well in decreasing pain levels.      NSAID/Tylenol - An anti-inflammatory, like Aleve or ibuprofen, and/or a pain medication, such as Tylenol, can help to improve pain levels and get the issue resolved sooner rather than later.  Anyone with liver issues should be careful with Tylenol, and anyone with high blood pressure or heart, stomach or kidney issues should be careful with anti-inflammatories.  Please ask if you have questions about these medications, including dosage.        Body Mass Index (BMI)  Many things can cause foot and ankle problems. Foot structure, activity level, foot mechanics and injuries are common causes of pain. One very important issue that often goes unmentioned, is body weight. Extra weight can cause increased stress on muscles, ligaments, bones and tendons. Sometimes just a few extra pounds is all it takes to put one over her/his threshold. Without reducing that stress, it can be difficult to alleviate pain. Some people are uncomfortable addressing this issue, but we feel it is important for you to think about it. As Foot &  Ankle specialists, our job is addressing the lower extremity problem and possible causes. Regarding extra body weight, we encourage patients to discuss diet and weight management plans with their primary care doctors. It is this team approach that gives you the best opportunity for pain relief and getting you back on your feet.

## 2018-01-23 NOTE — PROGRESS NOTES
"Foot & Ankle Surgery  January 23, 2018    CC: R foot cyst/boil    I was asked to see Albino Tejada regarding the chief complaint by:  Dr. TREVOR Apple    HPI:  Pt is a 62 year old male who presents with above complaint.  R foot cyst x 2 weeks, no injury noted. It gets a little bigger.  Shoes \"too long\" and pounding on concrete at work, it gets red/inflamed.  Better without shoes.  xrays 1/15/18 neg.  Labs show slight elevation of his UA and CRP.  He was put on a PO abx for some redness/pain and symptoms have improved.  Pain 6/10 continuously, worse with shoes.  Prednisone has helped.      ROS:   Pos for CC.  The patient denies current nausea, vomiting, chills, fevers, belly pain, calf pain, chest pain or SOB.  Complete remainder of ROS is otherwise neg.    VITALS:    Vitals:    01/23/18 1421   BP: 126/82   BP Location: Right arm   Patient Position: Sitting   Cuff Size: Adult Large   Weight: 213 lb (96.6 kg)   Height: 5' 6\" (1.676 m)       PMH:    Past Medical History:   Diagnosis Date     MALIGN NEOPL PROSTATE-s/p surgery and radiation 2006 10/10/2006       SXHX:    Past Surgical History:   Procedure Laterality Date     COLONOSCOPY N/A 10/26/2017    Procedure: COMBINED COLONOSCOPY, SINGLE OR MULTIPLE BIOPSY/POLYPECTOMY BY BIOPSY;;  Surgeon: Damon Brooks MD;  Location:  GI     GENITOURINARY SURGERY      urethral stricture repair     HEMORRHOIDECTOMY,EXTERNAL      Hemorrhoidectomy     ORTHOPEDIC SURGERY      5 X lumbar spine surgeries      SUPRAPUBIC PROSTATECTOMY          MEDS:    Current Outpatient Prescriptions   Medication     cephALEXin (KEFLEX) 500 MG capsule     acyclovir (ZOVIRAX) 200 MG capsule     zolpidem (AMBIEN) 5 MG tablet     No current facility-administered medications for this visit.        ALL:     Allergies   Allergen Reactions     No Known Allergies        FMH:    Family History   Problem Relation Age of Onset     CANCER Mother      breast     CANCER Father      colon     Coronary Artery " Disease Father      Arthritis No family hx of      Alzheimer Disease No family hx of      HEART DISEASE No family hx of        SocHx:    Social History     Social History     Marital status:      Spouse name: N/A     Number of children: 2     Years of education: 15     Occupational History     Manager [employer]     Social History Main Topics     Smoking status: Former Smoker     Packs/day: 0.50     Years: 6.00     Quit date: 1/1/1979     Smokeless tobacco: Never Used     Alcohol use 2.0 oz/week      Comment: a couple times a week     Drug use: No     Sexual activity: Yes     Partners: Female     Birth control/ protection: Condom     Other Topics Concern     Exercise Yes     Seat Belt Yes     Self-Exams Yes     Social History Narrative           EXAMINATION:  Gen:   No apparent distress  Neuro:   A&Ox3, no deficits  Psych:    Answering questions appropriately for age and situation with normal affect  Head:    NCAT  Eye:    Visual scanning without deficit  Ear:    Response to auditory stimuli wnl  Lung:    Non-labored breathing on RA noted  Abd:    NTND per patient report  Lymph:    Neg for pitting/non-pitting edema BLE  Vasc:    Pulses palpable, CFT minimally delayed  Neuro:    Light touch sensation intact to all sensory nerve distributions without paresthesias  Derm:    Neg for nodules, lesions or ulcerations  MSK:    R foot shows firm nodule/cyst near 5th met consistent with ganglion cyst.  Tender to palpation.  Minimal erythema/edema.    Calf:    Neg for redness, swelling or tenderness      Imaging:  xrays 1/15/18 - IMPRESSION: No osseous abnormality to correspond with the fifth metatarsal mass.    Labs:    Component      Latest Ref Rng & Units 1/15/2018   Sodium      133 - 144 mmol/L 141   Potassium      3.4 - 5.3 mmol/L 4.3   Chloride      94 - 109 mmol/L 109   Carbon Dioxide      20 - 32 mmol/L 24   Anion Gap      3 - 14 mmol/L 8   Glucose      70 - 99 mg/dL 103 (H)   Urea  Nitrogen      7 - 30 mg/dL 14   Creatinine      0.66 - 1.25 mg/dL 0.88   GFR Estimate      >60 mL/min/1.7m2 88   GFR Estimate If Black      >60 mL/min/1.7m2 >90   Calcium      8.5 - 10.1 mg/dL 9.0   WBC      4.0 - 11.0 10e9/L 5.1   RBC Count      4.4 - 5.9 10e12/L 4.50   Hemoglobin      13.3 - 17.7 g/dL 14.5   Hematocrit      40.0 - 53.0 % 42.1   MCV      78 - 100 fl 94   MCH      26.5 - 33.0 pg 32.2   MCHC      31.5 - 36.5 g/dL 34.4   RDW      10.0 - 15.0 % 13.9   Platelet Count      150 - 450 10e9/L 176   Specimen Description       Blood Right Arm   Special Requests       Aerobic and anaerobic bottles received   Culture Micro       No growth   Uric Acid      3.5 - 7.2 mg/dL 7.8 (H)   CRP Inflammation      0.0 - 8.0 mg/L 18.0 (H)   Sed Rate      0 - 20 mm/h 7     Assessment:  62 year old male with ganglion cyst R foot      Plan:  Discussed etiologies, anatomy and options  1.  Ganglion cyst right foot  -personally reviewed imaging and labs  -finish PO abx course  -RICE/NSAID prn  -discussed padding and shoe gear changes  -discussed aspiration vs surgical options  -follow up 1 week, aspiration if no improvement    Follow up:  1 week or sooner with acute issues      Patient's medical history was reviewed today    Body mass index is 34.38 kg/(m^2).  Weight management plan: Patient was referred to their PCP to discuss a diet and exercise plan.        Alexander Taylor DPM   Podiatric Foot & Ankle Surgeon  [facility]  [phone]

## 2018-09-29 NOTE — NURSING NOTE
"Chief Complaint   Patient presents with     Establish Care       Initial /85 (BP Location: Right arm, Patient Position: Chair, Cuff Size: Adult Large)  Pulse 70  Temp 98.3  F (36.8  C) (Oral)  Resp 18  Ht 5' 6\" (1.676 m)  Wt 211 lb (95.7 kg)  SpO2 98%  BMI 34.06 kg/m2 Estimated body mass index is 34.06 kg/(m^2) as calculated from the following:    Height as of this encounter: 5' 6\" (1.676 m).    Weight as of this encounter: 211 lb (95.7 kg).  Medication Reconciliation: complete   Estee Luevano CMA (AAMA)      " family/patient/mom

## 2019-12-08 ENCOUNTER — HEALTH MAINTENANCE LETTER (OUTPATIENT)
Age: 64
End: 2019-12-08

## 2021-01-09 ENCOUNTER — HEALTH MAINTENANCE LETTER (OUTPATIENT)
Age: 66
End: 2021-01-09

## 2021-10-23 ENCOUNTER — HEALTH MAINTENANCE LETTER (OUTPATIENT)
Age: 66
End: 2021-10-23

## 2022-02-12 ENCOUNTER — HEALTH MAINTENANCE LETTER (OUTPATIENT)
Age: 67
End: 2022-02-12

## 2022-10-09 ENCOUNTER — HEALTH MAINTENANCE LETTER (OUTPATIENT)
Age: 67
End: 2022-10-09

## 2023-03-25 ENCOUNTER — HEALTH MAINTENANCE LETTER (OUTPATIENT)
Age: 68
End: 2023-03-25

## 2024-05-25 ENCOUNTER — HEALTH MAINTENANCE LETTER (OUTPATIENT)
Age: 69
End: 2024-05-25

## (undated) RX ORDER — FENTANYL CITRATE 50 UG/ML
INJECTION, SOLUTION INTRAMUSCULAR; INTRAVENOUS
Status: DISPENSED
Start: 2017-10-26